# Patient Record
Sex: FEMALE | Race: WHITE | NOT HISPANIC OR LATINO | Employment: FULL TIME | URBAN - METROPOLITAN AREA
[De-identification: names, ages, dates, MRNs, and addresses within clinical notes are randomized per-mention and may not be internally consistent; named-entity substitution may affect disease eponyms.]

---

## 2024-10-09 NOTE — PROGRESS NOTES
[facility]  MD Lucien Heller, NP-C        Date of Service: 10/10/2024    Chief Complaint:   Martha Pavon is a 64 y.o. female presenting today due to a right nipple discharge. She first noticed this discharge one week and a half ago.   She noticed a spot in her Bra at the end of the night yesterday and one episode a week and a half ago. It is dark.      History of Present Illness:   Mrs. Martha Pavon first presented on November 21, 2012 after her imaging showed probably benign calcifications and 6-month follow-up imaging was recommended.Close follow up proved stability. She did not follow up with us after 12/17/2024 until she presents back October 10, 2024 with right nipple discharge. She has followed up with Dr. Cueto.    MD::: Mauricio Cueto MD; Av Webb MD.     Past Medical History:   Diagnosis Date    Anxiety     Bloody discharge from right nipple 10/10/2024    Breast calcifications     Complex endometrial hyperplasia without atypia     Female infertility, unspecified     History of abnormal cervical Pap smear     Hypercholesterolemia     Kidney stones     Osteopenia 03/06/2024    osteopenia of spine Normal hip    Subareolar mass of right breast 10/10/2024    Vitamin D deficiency       Past Surgical History:   Procedure Laterality Date    APPENDECTOMY  1978    CHOLECYSTECTOMY  12/1990    COLONOSCOPY  05/2018    COLONOSCOPY  07/26/2023    CYSTOSCOPY  01/2023    kidney stones    DIAGNOSTIC LAPAROSCOPY  04/16/1997    DILATION AND CURETTAGE OF UTERUS  12/15/1996    MOUTH SURGERY  1976    RALH/BSO  08/02/2018     bleeding        Current Outpatient Medications:     atorvastatin (LIPITOR) 10 MG tablet, Take 10 mg by mouth once daily., Disp: , Rfl:     cholecalciferol, vitamin D3, 1,250 mcg (50,000 unit) capsule, TAKE 1 CAPSULE BY MOUTH TAKE ONCE WEEKLY., Disp: , Rfl:     conjugated estrogens (PREMARIN) vaginal cream, Use 1 applicator  daily x 14 days then 1 applicator 2 times per week vaginally, Disp: 30 g, Rfl: 2    diclofenac (VOLTAREN) 75 MG EC tablet, Take 75 mg by mouth., Disp: , Rfl:     metFORMIN (GLUCOPHAGE) 500 MG tablet, Take 500 mg by mouth., Disp: , Rfl:     venlafaxine (EFFEXOR) 75 MG tablet, Take 150 mg by mouth once daily., Disp: , Rfl:    Review of patient's allergies indicates:   Allergen Reactions    Codeine Nausea Only and Nausea And Vomiting      Social History     Tobacco Use    Smoking status: Never    Smokeless tobacco: Never   Substance Use Topics    Alcohol use: Yes      Family History   Problem Relation Name Age of Onset    Asthma Sister      Hypertension Neg Hx      Diabetes Neg Hx      Heart disease Neg Hx      Breast cancer Neg Hx      Ovarian cancer Neg Hx          Review of Systems   Integumentary:  Positive for breast discharge. Negative for color change, rash, mole/lesion, breast mass and breast tenderness.   Breast: Negative for mass and tenderness       Mammogram: Today- The breast tissue is heterogeneously dense which can obscure small masses. Negative for suspicious mass, architectural distortion, calcifications. There is no mammographic evidence of malignancy. Screening 3/6/2024- This procedure was performed using tomosynthesis. Computer-aided detection was utilized in the interpretation of this examination.The breasts are heterogeneously dense, which may obscure small masses. There is no evidence of suspicious masses, calcifications, or other abnormal findings.There is no mammographic evidence of malignancy.     Ultrasound: Right Today- Right breast ultrasound:  Retroareolar right breast ultrasound performed with history of bloody nipple discharge.  There is a small 2 mm intraductal nodule. Within a  duct immediately posterior to the nipple.  Better seen at real-time ultrasound this was noted to be a duct. Impression:Small 2 mm polypoid intraductal nodule retroareolar right breast.  Question small  papilloma.    NOTE:::We viewed her films together at today's visit.  We discussed the multiple views obtained and the important findings.  Even benign changes were mentioned and her questions were answered.    ASSESSMENT and PLAN OF CARE     1. Bloody discharge from right nipple  Assessment & Plan:   A retroareolar mass is noted on today's ultrasound which is likely attributing to her discharge. We will proceed with sonocore biopsy first then make further recommendations pending her finalized pathology results. She understands and agrees with this plan.     Orders:  -     Cancel: Mammo Digital Diagnostic Bilat with [facility]; Future; Expected date: 10/10/2024  -     Cancel: US Breast Right Limited; Future; Expected date: 10/10/2024    2. Subareolar mass of right breast  Assessment & Plan:  Patient has been given the options of sonocore and excisional biopsy and all indications for each have been discussed. We reviewed her films and reports. She understands the reasons behind obtaining tissue in order to determine a diagnosis. She knows that further recommendations will be made after receiving the pathology report and that additional surgery/interventions may be needed. PLAN:::: RIGHT SONOCORE BREAST BIOPSY. I will call her as soon as her pathology report is received        Medical Decision Making: It is my impression that this patient suffers all conditions contained in this medical document.  Each of these conditions did affect our plan of care and my medical decision making today.  It is my opinion that the medical decision making concerning this patient was of moderate difficulty based on the aforementioned conditions.  Any further recommendations will be communicated to the patient by me.  I have reviewed and verified her allergies, list of medications, medical and surgical histories, social history, and a pertinent review of symptoms.     Follow up:  Follow up pending finalized pathology results

## 2024-10-10 ENCOUNTER — OFFICE VISIT (OUTPATIENT)
Dept: SURGERY | Facility: CLINIC | Age: 64
End: 2024-10-10
Payer: COMMERCIAL

## 2024-10-10 VITALS — WEIGHT: 150 LBS | BODY MASS INDEX: 29.45 KG/M2 | HEIGHT: 60 IN

## 2024-10-10 DIAGNOSIS — N63.41 SUBAREOLAR MASS OF RIGHT BREAST: ICD-10-CM

## 2024-10-10 DIAGNOSIS — N64.52 BLOODY DISCHARGE FROM RIGHT NIPPLE: Primary | ICD-10-CM

## 2024-10-10 PROCEDURE — 1159F MED LIST DOCD IN RCRD: CPT | Mod: CPTII,S$GLB,, | Performed by: NURSE PRACTITIONER

## 2024-10-10 PROCEDURE — 99999 PR PBB SHADOW E&M-EST. PATIENT-LVL III: CPT | Mod: PBBFAC,,, | Performed by: NURSE PRACTITIONER

## 2024-10-10 PROCEDURE — 1160F RVW MEDS BY RX/DR IN RCRD: CPT | Mod: CPTII,S$GLB,, | Performed by: NURSE PRACTITIONER

## 2024-10-10 PROCEDURE — 99204 OFFICE O/P NEW MOD 45 MIN: CPT | Mod: S$GLB,,, | Performed by: NURSE PRACTITIONER

## 2024-10-10 PROCEDURE — 3044F HG A1C LEVEL LT 7.0%: CPT | Mod: CPTII,S$GLB,, | Performed by: NURSE PRACTITIONER

## 2024-10-10 PROCEDURE — 3008F BODY MASS INDEX DOCD: CPT | Mod: CPTII,S$GLB,, | Performed by: NURSE PRACTITIONER

## 2024-10-10 RX ORDER — METFORMIN HYDROCHLORIDE 500 MG/1
500 TABLET ORAL
COMMUNITY
Start: 2024-09-26 | End: 2025-03-25

## 2024-10-10 RX ORDER — DICLOFENAC SODIUM 75 MG/1
75 TABLET, DELAYED RELEASE ORAL
COMMUNITY
Start: 2024-08-13

## 2024-10-10 NOTE — ASSESSMENT & PLAN NOTE
A retroareolar mass is noted on today's ultrasound which is likely attributing to her discharge. We will proceed with sonocore biopsy first then make further recommendations pending her finalized pathology results. She understands and agrees with this plan.

## 2024-10-10 NOTE — ASSESSMENT & PLAN NOTE
Patient has been given the options of sonocore and excisional biopsy and all indications for each have been discussed. We reviewed her films and reports. She understands the reasons behind obtaining tissue in order to determine a diagnosis. She knows that further recommendations will be made after receiving the pathology report and that additional surgery/interventions may be needed. PLAN:::: RIGHT SONOCORE BREAST BIOPSY. I will call her as soon as her pathology report is received

## 2024-11-07 NOTE — ASSESSMENT & PLAN NOTE
Her recent sonocore biopsy showed a fibroadenoma which does not answer why she is having a nipple discharge.  She has not had any further discharge and none is elicited on exam today.  We discussed watchful waiting.  She is to report any occurrence of a nipple discharge - we would then order a galactogram and proceed with ductal excision as needed.  She is to return in 6 months and prn.

## 2024-11-07 NOTE — PROGRESS NOTES
[facility]  MD Lucien Heller, NP-C        Date of Service: 11/13/2024    Chief Complaint:   Martha Pavon is a 64 y.o. female presenting today to discuss her pathology report.  She has not noticed any additional discharge.    History of Present Illness:   Mrs. Martha Pavon first presented on November 21, 2012 after her imaging showed probably benign calcifications and 6-month follow-up imaging was recommended.Close follow up proved stability. She did not follow up with us after 12/17/2024 until she presented back October 10, 2024 with right nipple discharge. Ultrasound showed an intraductal mass.  Sonocore biopsy showed a benign fibroadenoma.   MD::: Mauricio Cueto MD; Av Webb MD.     Past Medical History:   Diagnosis Date    Anxiety     Bloody discharge from right nipple 10/10/2024    Breast calcifications     Complex endometrial hyperplasia without atypia     Female infertility, unspecified     History of abnormal cervical Pap smear     Hypercholesterolemia     Kidney stones     Osteopenia 03/06/2024    osteopenia of spine Normal hip    Subareolar mass of right breast 10/10/2024    Vitamin D deficiency       Past Surgical History:   Procedure Laterality Date    APPENDECTOMY  1978    CHOLECYSTECTOMY  12/1990    COLONOSCOPY  05/2018    COLONOSCOPY  07/26/2023    CYSTOSCOPY  01/2023    kidney stones    DIAGNOSTIC LAPAROSCOPY  04/16/1997    DILATION AND CURETTAGE OF UTERUS  12/15/1996    MAMMO BREAST BIOPSY ( TECHNIQUE PER RADIOLOGY ) Right 10/17/2024    MOUTH SURGERY  1976    RALH/BSO  08/02/2018     bleeding        Current Outpatient Medications:     atorvastatin (LIPITOR) 10 MG tablet, Take 10 mg by mouth once daily., Disp: , Rfl:     cholecalciferol, vitamin D3, 1,250 mcg (50,000 unit) capsule, TAKE 1 CAPSULE BY MOUTH TAKE ONCE WEEKLY., Disp: , Rfl:     conjugated estrogens (PREMARIN) vaginal cream, Use 1 applicator daily x 14 days  then 1 applicator 2 times per week vaginally, Disp: 30 g, Rfl: 2    diclofenac (VOLTAREN) 75 MG EC tablet, Take 75 mg by mouth., Disp: , Rfl:     metFORMIN (GLUCOPHAGE) 500 MG tablet, Take 500 mg by mouth., Disp: , Rfl:     venlafaxine (EFFEXOR) 75 MG tablet, Take 150 mg by mouth once daily., Disp: , Rfl:    Review of patient's allergies indicates:   Allergen Reactions    Codeine Nausea Only and Nausea And Vomiting      Social History     Tobacco Use    Smoking status: Never    Smokeless tobacco: Never   Substance Use Topics    Alcohol use: Yes      Family History   Problem Relation Name Age of Onset    Asthma Sister      Hypertension Neg Hx      Diabetes Neg Hx      Heart disease Neg Hx      Breast cancer Neg Hx      Ovarian cancer Neg Hx        Review of Systems   Integumentary:  Positive for breast discharge. Negative for color change, rash, mole/lesion, breast mass and breast tenderness.   Breast: Negative for mass and tenderness   Physical Exam   Constitutional: She appears well-developed. She is cooperative.   HENT:   Head: Normocephalic.   Cardiovascular:  Normal rate and regular rhythm.            Pulmonary/Chest: She exhibits no tenderness and no bony tenderness. Right breast exhibits nipple discharge. Right breast exhibits no mass, no skin change and no tenderness. Left breast exhibits no mass, no nipple discharge, no skin change and no tenderness.   Abdominal: Soft. Normal appearance.   Musculoskeletal: Lymphadenopathy:      Upper Body:      Right upper body: No supraclavicular or axillary adenopathy.      Left upper body: No supraclavicular or axillary adenopathy.     Neurological: She is alert.   Skin: No rash noted.        MAMMOGRAM REPORT: The breast tissue is heterogeneously dense which can obscure small masses. Negative for suspicious mass, architectural distortion, calcifications. There is no mammographic evidence of malignancy.     ULTRASOUND REPORT:  Right breast ultrasound:  Retroareolar right  breast ultrasound performed with history of bloody nipple discharge.  There is a small 2 mm intraductal nodule. Within a  duct immediately posterior to the nipple.  Better seen at real-time ultrasound this was noted to be a duct.      ASSESSMENT and PLAN OF CARE     1. Bloody discharge from right nipple  Assessment & Plan:  Her recent sonocore biopsy showed a fibroadenoma which does not answer why she is having a nipple discharge.  She has not had any further discharge and none is elicited on exam today.  We discussed watchful waiting.  She is to report any occurrence of a nipple discharge - we would then order a galactogram and proceed with ductal excision as needed.  She is to return in 6 months and prn.      Medical Decision Making: It is my impression that this patient suffers all conditions contained in this medical document.  Each of these conditions did affect our plan of care and my medical decision making today.  It is my opinion that the medical decision making concerning this patient was of moderate difficulty based on the aforementioned conditions.  Any further recommendations will be communicated to the patient by me.  I have reviewed and verified her allergies, list of medications, medical and surgical histories, social history, and a pertinent review of symptoms.     Follow up:  6 months and prn for PE

## 2024-11-13 ENCOUNTER — OFFICE VISIT (OUTPATIENT)
Dept: SURGERY | Facility: CLINIC | Age: 64
End: 2024-11-13
Payer: COMMERCIAL

## 2024-11-13 DIAGNOSIS — N64.52 BLOODY DISCHARGE FROM RIGHT NIPPLE: Primary | ICD-10-CM

## 2024-11-13 PROCEDURE — 1160F RVW MEDS BY RX/DR IN RCRD: CPT | Mod: CPTII,S$GLB,, | Performed by: SPECIALIST

## 2024-11-13 PROCEDURE — 1159F MED LIST DOCD IN RCRD: CPT | Mod: CPTII,S$GLB,, | Performed by: SPECIALIST

## 2024-11-13 PROCEDURE — 3044F HG A1C LEVEL LT 7.0%: CPT | Mod: CPTII,S$GLB,, | Performed by: SPECIALIST

## 2024-11-13 PROCEDURE — 99999 PR PBB SHADOW E&M-EST. PATIENT-LVL II: CPT | Mod: PBBFAC,,, | Performed by: SPECIALIST

## 2024-11-13 PROCEDURE — 99213 OFFICE O/P EST LOW 20 MIN: CPT | Mod: S$GLB,,, | Performed by: SPECIALIST
